# Patient Record
Sex: FEMALE | Race: WHITE | NOT HISPANIC OR LATINO | URBAN - METROPOLITAN AREA
[De-identification: names, ages, dates, MRNs, and addresses within clinical notes are randomized per-mention and may not be internally consistent; named-entity substitution may affect disease eponyms.]

---

## 2017-06-21 ENCOUNTER — EMERGENCY (EMERGENCY)
Facility: HOSPITAL | Age: 32
LOS: 1 days | Discharge: HOME | End: 2017-06-21
Attending: EMERGENCY MEDICINE | Admitting: EMERGENCY MEDICINE
Payer: COMMERCIAL

## 2017-06-21 VITALS
RESPIRATION RATE: 17 BRPM | OXYGEN SATURATION: 100 % | DIASTOLIC BLOOD PRESSURE: 68 MMHG | HEART RATE: 56 BPM | TEMPERATURE: 99 F | SYSTOLIC BLOOD PRESSURE: 110 MMHG

## 2017-06-21 VITALS
TEMPERATURE: 98 F | SYSTOLIC BLOOD PRESSURE: 93 MMHG | DIASTOLIC BLOOD PRESSURE: 65 MMHG | RESPIRATION RATE: 18 BRPM | OXYGEN SATURATION: 99 % | HEART RATE: 65 BPM

## 2017-06-21 DIAGNOSIS — R10.30 LOWER ABDOMINAL PAIN, UNSPECIFIED: ICD-10-CM

## 2017-06-21 DIAGNOSIS — N94.6 DYSMENORRHEA, UNSPECIFIED: ICD-10-CM

## 2017-06-21 LAB
APPEARANCE UR: SIGNIFICANT CHANGE UP
BACTERIA # UR AUTO: PRESENT /HPF
BILIRUB UR-MCNC: NEGATIVE — SIGNIFICANT CHANGE UP
COLOR SPEC: SIGNIFICANT CHANGE UP
DIFF PNL FLD: (no result)
EPI CELLS # UR: SIGNIFICANT CHANGE UP /HPF
GLUCOSE UR QL: NEGATIVE — SIGNIFICANT CHANGE UP
KETONES UR-MCNC: NEGATIVE — SIGNIFICANT CHANGE UP
LEUKOCYTE ESTERASE UR-ACNC: NEGATIVE — SIGNIFICANT CHANGE UP
NITRITE UR-MCNC: NEGATIVE — SIGNIFICANT CHANGE UP
PH UR: 6.5 — SIGNIFICANT CHANGE UP (ref 5–8)
PROT UR-MCNC: 30 MG/DL
RBC CASTS # UR COMP ASSIST: (no result) /HPF
SP GR SPEC: 1.01 — SIGNIFICANT CHANGE UP (ref 1–1.03)
UROBILINOGEN FLD QL: 0.2 E.U./DL — SIGNIFICANT CHANGE UP
WBC UR QL: < 5 /HPF — SIGNIFICANT CHANGE UP

## 2017-06-21 PROCEDURE — 99283 EMERGENCY DEPT VISIT LOW MDM: CPT | Mod: 25

## 2017-06-21 PROCEDURE — 81001 URINALYSIS AUTO W/SCOPE: CPT

## 2017-06-21 PROCEDURE — 99284 EMERGENCY DEPT VISIT MOD MDM: CPT

## 2017-06-21 PROCEDURE — 87086 URINE CULTURE/COLONY COUNT: CPT

## 2017-06-21 PROCEDURE — 96372 THER/PROPH/DIAG INJ SC/IM: CPT

## 2017-06-21 RX ORDER — KETOROLAC TROMETHAMINE 30 MG/ML
60 SYRINGE (ML) INJECTION ONCE
Qty: 0 | Refills: 0 | Status: DISCONTINUED | OUTPATIENT
Start: 2017-06-21 | End: 2017-06-21

## 2017-06-21 RX ADMIN — Medication 60 MILLIGRAM(S): at 09:09

## 2017-06-21 RX ADMIN — Medication 60 MILLIGRAM(S): at 09:28

## 2017-06-21 NOTE — ED ADULT NURSE NOTE - OBJECTIVE STATEMENT
Pt BIBA for intermittent severe suprapubic, lower abd cramping pain starting this morning that radiates to lower back. Pt reports she is in her menstrual cycle today and also reports sexual intercourse 1 week ago "after not having it for a while".  Pt denies any trauma during intercourse, but reports some spotting during and after intercourse.  Pt is alert and oriented x3.  Positive PMS x4 extremities. Pt denies all other medical complaints at this time.

## 2017-06-21 NOTE — ED PROVIDER NOTE - MEDICAL DECISION MAKING DETAILS
pt c/o menstrual cramps since this am, hx of cramps in past - feels same, no hx of ovarian cysts, benign abd and normal pelvic exam, ucg neg, pain resolved w/toradol in ed - no concern for torsion at this time hence no indication for any imaging or labs, pt advised to f/u w/her gyn for ? OCPs to control symptoms, in meantime to take prn ibuprofen, pt understands and agrees w/plan

## 2017-06-21 NOTE — ED PROVIDER NOTE - OBJECTIVE STATEMENT
The pt is a 31 y/o F, BIBA, c/o menstrual cramps this am. Pt states hx of "bad cramps", this is day 1 of menses, has not taken any pain meds because was in "too much pain", no hx of ovarian cysts, sexually active but no vag d/c. Pain to mid lower abd, cramp like, non radiating, 10/10. Denies n/v/d, fever, cp, sob, dizziness, dysuria

## 2017-06-21 NOTE — ED PROVIDER NOTE - ATTENDING CONTRIBUTION TO CARE
33 y/o female c/o severe menstrual cramps this am, h/o menorrhagia in the past, but more severe this am w n; unable to take tylenol 2/2 severity of pain and gen weakness accompanying the pain.  No abnl discharge, dysuria, h/o ovarian cysts,  + sexually active but no h/o std.  Pain to mid lower abd, cramp like, non radiating, 10/10.  Now feeling improved after toradol im.  Well appearing (prev moaning in pain), nc/at, nl resp effort, heart reg, abd soft, mild suprapubic ttp, pelvic per pa exam, ext no c/c/e, no gross neuro deficits.  Suspect menorrhagia, nontender on nl pelvic so doubt ovarian cyst, pid.  Plan toradol, reassess.  Likely dc to f/u pmd, gyn.

## 2017-06-21 NOTE — ED PROVIDER NOTE - GENITOURINARY, MLM
normal F genitalia, no rash, no lesions, + blood in vault (menses), no CMT, no adnexal tend or masses b/l

## 2017-06-22 LAB
CULTURE RESULTS: NO GROWTH — SIGNIFICANT CHANGE UP
SPECIMEN SOURCE: SIGNIFICANT CHANGE UP